# Patient Record
Sex: FEMALE | Race: WHITE | NOT HISPANIC OR LATINO | Employment: FULL TIME | URBAN - METROPOLITAN AREA
[De-identification: names, ages, dates, MRNs, and addresses within clinical notes are randomized per-mention and may not be internally consistent; named-entity substitution may affect disease eponyms.]

---

## 2021-01-26 ENCOUNTER — HOSPITAL ENCOUNTER (EMERGENCY)
Facility: MEDICAL CENTER | Age: 31
End: 2021-01-26
Attending: OBSTETRICS & GYNECOLOGY | Admitting: OBSTETRICS & GYNECOLOGY
Payer: COMMERCIAL

## 2021-01-26 VITALS
RESPIRATION RATE: 18 BRPM | DIASTOLIC BLOOD PRESSURE: 72 MMHG | WEIGHT: 173 LBS | BODY MASS INDEX: 28.82 KG/M2 | HEIGHT: 65 IN | SYSTOLIC BLOOD PRESSURE: 114 MMHG | HEART RATE: 81 BPM | OXYGEN SATURATION: 96 %

## 2021-01-26 LAB
ALBUMIN SERPL BCP-MCNC: 3.6 G/DL (ref 3.2–4.9)
ALBUMIN/GLOB SERPL: 1.3 G/DL
ALP SERPL-CCNC: 127 U/L (ref 30–99)
ALT SERPL-CCNC: 12 U/L (ref 2–50)
ANION GAP SERPL CALC-SCNC: 8 MMOL/L (ref 7–16)
APPEARANCE UR: CLEAR
AST SERPL-CCNC: 21 U/L (ref 12–45)
BASOPHILS # BLD AUTO: 0.2 % (ref 0–1.8)
BASOPHILS # BLD: 0.02 K/UL (ref 0–0.12)
BILIRUB SERPL-MCNC: 0.3 MG/DL (ref 0.1–1.5)
BUN SERPL-MCNC: 5 MG/DL (ref 8–22)
CALCIUM SERPL-MCNC: 9.4 MG/DL (ref 8.5–10.5)
CHLORIDE SERPL-SCNC: 103 MMOL/L (ref 96–112)
CO2 SERPL-SCNC: 24 MMOL/L (ref 20–33)
COLOR UR AUTO: YELLOW
CREAT SERPL-MCNC: 0.54 MG/DL (ref 0.5–1.4)
CREAT UR-MCNC: 33.92 MG/DL
EOSINOPHIL # BLD AUTO: 0.02 K/UL (ref 0–0.51)
EOSINOPHIL NFR BLD: 0.2 % (ref 0–6.9)
ERYTHROCYTE [DISTWIDTH] IN BLOOD BY AUTOMATED COUNT: 41.5 FL (ref 35.9–50)
GLOBULIN SER CALC-MCNC: 2.8 G/DL (ref 1.9–3.5)
GLUCOSE SERPL-MCNC: 86 MG/DL (ref 65–99)
GLUCOSE UR QL STRIP.AUTO: NEGATIVE MG/DL
HCT VFR BLD AUTO: 36.7 % (ref 37–47)
HGB BLD-MCNC: 12.4 G/DL (ref 12–16)
IMM GRANULOCYTES # BLD AUTO: 0.05 K/UL (ref 0–0.11)
IMM GRANULOCYTES NFR BLD AUTO: 0.5 % (ref 0–0.9)
KETONES UR QL STRIP.AUTO: NEGATIVE MG/DL
LEUKOCYTE ESTERASE UR QL STRIP.AUTO: ABNORMAL
LYMPHOCYTES # BLD AUTO: 2.19 K/UL (ref 1–4.8)
LYMPHOCYTES NFR BLD: 23 % (ref 22–41)
MCH RBC QN AUTO: 32 PG (ref 27–33)
MCHC RBC AUTO-ENTMCNC: 33.8 G/DL (ref 33.6–35)
MCV RBC AUTO: 94.8 FL (ref 81.4–97.8)
MONOCYTES # BLD AUTO: 0.66 K/UL (ref 0–0.85)
MONOCYTES NFR BLD AUTO: 6.9 % (ref 0–13.4)
NEUTROPHILS # BLD AUTO: 6.59 K/UL (ref 2–7.15)
NEUTROPHILS NFR BLD: 69.2 % (ref 44–72)
NITRITE UR QL STRIP.AUTO: NEGATIVE
NRBC # BLD AUTO: 0 K/UL
NRBC BLD-RTO: 0 /100 WBC
PH UR STRIP.AUTO: 6 [PH] (ref 5–8)
PLATELET # BLD AUTO: 221 K/UL (ref 164–446)
PMV BLD AUTO: 10.6 FL (ref 9–12.9)
POTASSIUM SERPL-SCNC: 4.2 MMOL/L (ref 3.6–5.5)
PROT SERPL-MCNC: 6.4 G/DL (ref 6–8.2)
PROT UR QL STRIP: NEGATIVE MG/DL
PROT UR-MCNC: 5 MG/DL (ref 0–15)
PROT/CREAT UR: 147 MG/G (ref 10–107)
RBC # BLD AUTO: 3.87 M/UL (ref 4.2–5.4)
RBC UR QL AUTO: NEGATIVE
SODIUM SERPL-SCNC: 135 MMOL/L (ref 135–145)
SP GR UR STRIP.AUTO: 1.01 (ref 1–1.03)
URATE SERPL-MCNC: 3.8 MG/DL (ref 1.9–8.2)
WBC # BLD AUTO: 9.5 K/UL (ref 4.8–10.8)

## 2021-01-26 PROCEDURE — 85025 COMPLETE CBC W/AUTO DIFF WBC: CPT

## 2021-01-26 PROCEDURE — 36415 COLL VENOUS BLD VENIPUNCTURE: CPT

## 2021-01-26 PROCEDURE — 80053 COMPREHEN METABOLIC PANEL: CPT

## 2021-01-26 PROCEDURE — 302449 STATCHG TRIAGE ONLY (STATISTIC)

## 2021-01-26 PROCEDURE — 81002 URINALYSIS NONAUTO W/O SCOPE: CPT

## 2021-01-26 PROCEDURE — 82570 ASSAY OF URINE CREATININE: CPT

## 2021-01-26 PROCEDURE — 84156 ASSAY OF PROTEIN URINE: CPT

## 2021-01-26 PROCEDURE — 59025 FETAL NON-STRESS TEST: CPT

## 2021-01-26 PROCEDURE — 84550 ASSAY OF BLOOD/URIC ACID: CPT

## 2021-01-26 NOTE — PROGRESS NOTES
1350: Report received from KATLYN Decker RN.     1430: Dr. Ardon updated with patient status, lab orders received.     1435: Pt educated on the POC, all questions and concerns addressed.     1446:  at bedside to draw labs.     1543: Dr. Ardon updated with patient's lab results, orders received to discharge patient home, have patient call and schedule an in person appointment with her next week.     1555: Discharge instructions gone over with patient, all questions and concerns addressed.

## 2021-01-26 NOTE — DISCHARGE INSTRUCTIONS
Pre-term Labor (<37 weeks):  Call your physician or return to the hospital if:  · You have painless regular contractions more than 4 in one hour.  · Your water breaks (remember time and color).  · You have menstrual-like cramps, a low dull backache or pressure in your pelvis or back.  · Your baby does not move enough to complete the daily kick count (10 movements in 2 hours).  · Your baby moves much less often than on the days before or you have not felt your baby move all day.  · Please review the MEDICATION LIST section of your AFTER VISIT SUMMARY document.  · Take your medication as prescribed

## 2021-01-26 NOTE — PROGRESS NOTES
29 y/o , EDC 21, EGA 35.5. Pt states she took her BP at home and it was 150s/90s. Pt states she had spotty/blurry vision last week and again today. Pt called OB office who told her to come to the hospital for evaluation. Pt states +FM, denies vaginal LOF/bleeding. States come tightening for an hour last night but no UCs today.

## 2021-02-24 ENCOUNTER — HOSPITAL ENCOUNTER (EMERGENCY)
Facility: MEDICAL CENTER | Age: 31
End: 2021-02-24
Attending: OBSTETRICS & GYNECOLOGY | Admitting: OBSTETRICS & GYNECOLOGY
Payer: COMMERCIAL

## 2021-02-24 VITALS
SYSTOLIC BLOOD PRESSURE: 127 MMHG | OXYGEN SATURATION: 100 % | HEART RATE: 86 BPM | RESPIRATION RATE: 17 BRPM | WEIGHT: 178 LBS | BODY MASS INDEX: 29.66 KG/M2 | DIASTOLIC BLOOD PRESSURE: 79 MMHG | TEMPERATURE: 98.6 F | HEIGHT: 65 IN

## 2021-02-24 PROCEDURE — 59025 FETAL NON-STRESS TEST: CPT

## 2021-02-24 PROCEDURE — 302449 STATCHG TRIAGE ONLY (STATISTIC)

## 2021-02-24 ASSESSMENT — FIBROSIS 4 INDEX: FIB4 SCORE: 0.8

## 2021-02-24 NOTE — PROGRESS NOTES
1414-Pt presents to L&D c/o UC's every 4 minutes for the past 3 hours. Denies LOF or VB and confirms +FM. Reports being .5 cm dilated yesterday in the office. TOCO and EFM applied. VS taken. POC discussed  1420-SVE as charted  1435-Left message for Dr. Ardon  1441-Dr. Ardon phoned back, updated on pt arrival/complaint/status, strip reviewed by provider, discharge orders received  1453-Pt discharged home, ambulatory and undelivered. Provided general and term labor instructions, understanding verbalized

## 2021-02-25 ENCOUNTER — ANESTHESIA (OUTPATIENT)
Dept: ANESTHESIOLOGY | Facility: MEDICAL CENTER | Age: 31
End: 2021-02-25
Payer: COMMERCIAL

## 2021-02-25 ENCOUNTER — HOSPITAL ENCOUNTER (INPATIENT)
Facility: MEDICAL CENTER | Age: 31
LOS: 2 days | End: 2021-02-27
Attending: OBSTETRICS & GYNECOLOGY | Admitting: OBSTETRICS & GYNECOLOGY
Payer: COMMERCIAL

## 2021-02-25 ENCOUNTER — ANESTHESIA EVENT (OUTPATIENT)
Dept: ANESTHESIOLOGY | Facility: MEDICAL CENTER | Age: 31
End: 2021-02-25
Payer: COMMERCIAL

## 2021-02-25 LAB
BASOPHILS # BLD AUTO: 0.4 % (ref 0–1.8)
BASOPHILS # BLD: 0.04 K/UL (ref 0–0.12)
EOSINOPHIL # BLD AUTO: 0.01 K/UL (ref 0–0.51)
EOSINOPHIL NFR BLD: 0.1 % (ref 0–6.9)
ERYTHROCYTE [DISTWIDTH] IN BLOOD BY AUTOMATED COUNT: 42.8 FL (ref 35.9–50)
ERYTHROCYTE [DISTWIDTH] IN BLOOD BY AUTOMATED COUNT: 43.7 FL (ref 35.9–50)
HCT VFR BLD AUTO: 35.3 % (ref 37–47)
HCT VFR BLD AUTO: 37.4 % (ref 37–47)
HGB BLD-MCNC: 11.7 G/DL (ref 12–16)
HGB BLD-MCNC: 12.8 G/DL (ref 12–16)
HOLDING TUBE BB 8507: NORMAL
IMM GRANULOCYTES # BLD AUTO: 0.06 K/UL (ref 0–0.11)
IMM GRANULOCYTES NFR BLD AUTO: 0.6 % (ref 0–0.9)
LYMPHOCYTES # BLD AUTO: 1.94 K/UL (ref 1–4.8)
LYMPHOCYTES NFR BLD: 18.2 % (ref 22–41)
MCH RBC QN AUTO: 31 PG (ref 27–33)
MCH RBC QN AUTO: 31.9 PG (ref 27–33)
MCHC RBC AUTO-ENTMCNC: 33.1 G/DL (ref 33.6–35)
MCHC RBC AUTO-ENTMCNC: 34.2 G/DL (ref 33.6–35)
MCV RBC AUTO: 93.3 FL (ref 81.4–97.8)
MCV RBC AUTO: 93.6 FL (ref 81.4–97.8)
MONOCYTES # BLD AUTO: 0.58 K/UL (ref 0–0.85)
MONOCYTES NFR BLD AUTO: 5.4 % (ref 0–13.4)
NEUTROPHILS # BLD AUTO: 8.05 K/UL (ref 2–7.15)
NEUTROPHILS NFR BLD: 75.3 % (ref 44–72)
NRBC # BLD AUTO: 0 K/UL
NRBC BLD-RTO: 0 /100 WBC
PLATELET # BLD AUTO: 155 K/UL (ref 164–446)
PLATELET # BLD AUTO: 196 K/UL (ref 164–446)
PMV BLD AUTO: 11 FL (ref 9–12.9)
PMV BLD AUTO: 11.1 FL (ref 9–12.9)
RBC # BLD AUTO: 3.77 M/UL (ref 4.2–5.4)
RBC # BLD AUTO: 4.01 M/UL (ref 4.2–5.4)
SARS-COV+SARS-COV-2 AG RESP QL IA.RAPID: NOTDETECTED
SARS-COV-2 RNA RESP QL NAA+PROBE: NOTDETECTED
SPECIMEN SOURCE: NORMAL
SPECIMEN SOURCE: NORMAL
WBC # BLD AUTO: 10.3 K/UL (ref 4.8–10.8)
WBC # BLD AUTO: 10.7 K/UL (ref 4.8–10.8)

## 2021-02-25 PROCEDURE — 700102 HCHG RX REV CODE 250 W/ 637 OVERRIDE(OP): Performed by: OBSTETRICS & GYNECOLOGY

## 2021-02-25 PROCEDURE — 85025 COMPLETE CBC W/AUTO DIFF WBC: CPT

## 2021-02-25 PROCEDURE — 36415 COLL VENOUS BLD VENIPUNCTURE: CPT

## 2021-02-25 PROCEDURE — 700111 HCHG RX REV CODE 636 W/ 250 OVERRIDE (IP): Performed by: OBSTETRICS & GYNECOLOGY

## 2021-02-25 PROCEDURE — 700111 HCHG RX REV CODE 636 W/ 250 OVERRIDE (IP): Performed by: ANESTHESIOLOGY

## 2021-02-25 PROCEDURE — 87426 SARSCOV CORONAVIRUS AG IA: CPT

## 2021-02-25 PROCEDURE — 303615 HCHG EPIDURAL/SPINAL ANESTHESIA FOR LABOR

## 2021-02-25 PROCEDURE — 700111 HCHG RX REV CODE 636 W/ 250 OVERRIDE (IP)

## 2021-02-25 PROCEDURE — U0005 INFEC AGEN DETEC AMPLI PROBE: HCPCS

## 2021-02-25 PROCEDURE — 770002 HCHG ROOM/CARE - OB PRIVATE (112)

## 2021-02-25 PROCEDURE — 700105 HCHG RX REV CODE 258: Performed by: ANESTHESIOLOGY

## 2021-02-25 PROCEDURE — 700105 HCHG RX REV CODE 258: Performed by: OBSTETRICS & GYNECOLOGY

## 2021-02-25 PROCEDURE — 0KQM0ZZ REPAIR PERINEUM MUSCLE, OPEN APPROACH: ICD-10-PCS | Performed by: OBSTETRICS & GYNECOLOGY

## 2021-02-25 PROCEDURE — U0003 INFECTIOUS AGENT DETECTION BY NUCLEIC ACID (DNA OR RNA); SEVERE ACUTE RESPIRATORY SYNDROME CORONAVIRUS 2 (SARS-COV-2) (CORONAVIRUS DISEASE [COVID-19]), AMPLIFIED PROBE TECHNIQUE, MAKING USE OF HIGH THROUGHPUT TECHNOLOGIES AS DESCRIBED BY CMS-2020-01-R: HCPCS

## 2021-02-25 PROCEDURE — 302449 STATCHG TRIAGE ONLY (STATISTIC)

## 2021-02-25 PROCEDURE — 85027 COMPLETE CBC AUTOMATED: CPT

## 2021-02-25 PROCEDURE — 59409 OBSTETRICAL CARE: CPT

## 2021-02-25 PROCEDURE — 304965 HCHG RECOVERY SERVICES

## 2021-02-25 PROCEDURE — A9270 NON-COVERED ITEM OR SERVICE: HCPCS | Performed by: OBSTETRICS & GYNECOLOGY

## 2021-02-25 RX ORDER — MISOPROSTOL 200 UG/1
800 TABLET ORAL
Status: DISCONTINUED | OUTPATIENT
Start: 2021-02-25 | End: 2021-02-27 | Stop reason: HOSPADM

## 2021-02-25 RX ORDER — OXYCODONE HYDROCHLORIDE 5 MG/1
5 TABLET ORAL EVERY 4 HOURS PRN
Status: DISCONTINUED | OUTPATIENT
Start: 2021-02-25 | End: 2021-02-27 | Stop reason: HOSPADM

## 2021-02-25 RX ORDER — DOCUSATE SODIUM 100 MG/1
100 CAPSULE, LIQUID FILLED ORAL 2 TIMES DAILY PRN
Status: DISCONTINUED | OUTPATIENT
Start: 2021-02-25 | End: 2021-02-27 | Stop reason: HOSPADM

## 2021-02-25 RX ORDER — ALUMINA, MAGNESIA, AND SIMETHICONE 2400; 2400; 240 MG/30ML; MG/30ML; MG/30ML
30 SUSPENSION ORAL EVERY 6 HOURS PRN
Status: DISCONTINUED | OUTPATIENT
Start: 2021-02-25 | End: 2021-02-25 | Stop reason: HOSPADM

## 2021-02-25 RX ORDER — VITAMIN A ACETATE, BETA CAROTENE, ASCORBIC ACID, CHOLECALCIFEROL, .ALPHA.-TOCOPHEROL ACETATE, DL-, THIAMINE MONONITRATE, RIBOFLAVIN, NIACINAMIDE, PYRIDOXINE HYDROCHLORIDE, FOLIC ACID, CYANOCOBALAMIN, CALCIUM CARBONATE, FERROUS FUMARATE, ZINC OXIDE, CUPRIC OXIDE 3080; 12; 120; 400; 1; 1.84; 3; 20; 22; 920; 25; 200; 27; 10; 2 [IU]/1; UG/1; MG/1; [IU]/1; MG/1; MG/1; MG/1; MG/1; MG/1; [IU]/1; MG/1; MG/1; MG/1; MG/1; MG/1
1 TABLET, FILM COATED ORAL
Status: DISCONTINUED | OUTPATIENT
Start: 2021-02-25 | End: 2021-02-27 | Stop reason: HOSPADM

## 2021-02-25 RX ORDER — SODIUM CHLORIDE, SODIUM LACTATE, POTASSIUM CHLORIDE, AND CALCIUM CHLORIDE .6; .31; .03; .02 G/100ML; G/100ML; G/100ML; G/100ML
250 INJECTION, SOLUTION INTRAVENOUS PRN
Status: DISCONTINUED | OUTPATIENT
Start: 2021-02-25 | End: 2021-02-25 | Stop reason: HOSPADM

## 2021-02-25 RX ORDER — ROPIVACAINE HYDROCHLORIDE 2 MG/ML
INJECTION, SOLUTION EPIDURAL; INFILTRATION; PERINEURAL CONTINUOUS
Status: DISCONTINUED | OUTPATIENT
Start: 2021-02-25 | End: 2021-02-27 | Stop reason: HOSPADM

## 2021-02-25 RX ORDER — IBUPROFEN 600 MG/1
600 TABLET ORAL EVERY 6 HOURS PRN
Status: DISCONTINUED | OUTPATIENT
Start: 2021-02-25 | End: 2021-02-27 | Stop reason: HOSPADM

## 2021-02-25 RX ORDER — SODIUM CHLORIDE, SODIUM LACTATE, POTASSIUM CHLORIDE, CALCIUM CHLORIDE 600; 310; 30; 20 MG/100ML; MG/100ML; MG/100ML; MG/100ML
INJECTION, SOLUTION INTRAVENOUS PRN
Status: DISCONTINUED | OUTPATIENT
Start: 2021-02-25 | End: 2021-02-27 | Stop reason: HOSPADM

## 2021-02-25 RX ORDER — ACETAMINOPHEN 325 MG/1
325 TABLET ORAL EVERY 4 HOURS PRN
Status: DISCONTINUED | OUTPATIENT
Start: 2021-02-25 | End: 2021-02-27 | Stop reason: HOSPADM

## 2021-02-25 RX ORDER — BUPIVACAINE HYDROCHLORIDE 2.5 MG/ML
INJECTION, SOLUTION EPIDURAL; INFILTRATION; INTRACAUDAL PRN
Status: DISCONTINUED | OUTPATIENT
Start: 2021-02-25 | End: 2021-02-25 | Stop reason: SURG

## 2021-02-25 RX ORDER — ONDANSETRON 2 MG/ML
4 INJECTION INTRAMUSCULAR; INTRAVENOUS EVERY 6 HOURS PRN
Status: DISCONTINUED | OUTPATIENT
Start: 2021-02-25 | End: 2021-02-27 | Stop reason: HOSPADM

## 2021-02-25 RX ORDER — SODIUM CHLORIDE, SODIUM LACTATE, POTASSIUM CHLORIDE, AND CALCIUM CHLORIDE .6; .31; .03; .02 G/100ML; G/100ML; G/100ML; G/100ML
1000 INJECTION, SOLUTION INTRAVENOUS
Status: COMPLETED | OUTPATIENT
Start: 2021-02-25 | End: 2021-02-25

## 2021-02-25 RX ORDER — ROPIVACAINE HYDROCHLORIDE 2 MG/ML
INJECTION, SOLUTION EPIDURAL; INFILTRATION; PERINEURAL
Status: COMPLETED
Start: 2021-02-25 | End: 2021-02-25

## 2021-02-25 RX ORDER — IBUPROFEN 600 MG/1
600 TABLET ORAL EVERY 6 HOURS PRN
Status: DISCONTINUED | OUTPATIENT
Start: 2021-02-25 | End: 2021-02-25

## 2021-02-25 RX ORDER — SODIUM CHLORIDE, SODIUM LACTATE, POTASSIUM CHLORIDE, CALCIUM CHLORIDE 600; 310; 30; 20 MG/100ML; MG/100ML; MG/100ML; MG/100ML
INJECTION, SOLUTION INTRAVENOUS CONTINUOUS
Status: ACTIVE | OUTPATIENT
Start: 2021-02-25 | End: 2021-02-25

## 2021-02-25 RX ORDER — BUPIVACAINE HYDROCHLORIDE 2.5 MG/ML
INJECTION, SOLUTION EPIDURAL; INFILTRATION; INTRACAUDAL
Status: COMPLETED
Start: 2021-02-25 | End: 2021-02-25

## 2021-02-25 RX ORDER — MISOPROSTOL 200 UG/1
800 TABLET ORAL
Status: DISCONTINUED | OUTPATIENT
Start: 2021-02-25 | End: 2021-02-25 | Stop reason: HOSPADM

## 2021-02-25 RX ORDER — ONDANSETRON 4 MG/1
4 TABLET, ORALLY DISINTEGRATING ORAL EVERY 6 HOURS PRN
Status: DISCONTINUED | OUTPATIENT
Start: 2021-02-25 | End: 2021-02-27 | Stop reason: HOSPADM

## 2021-02-25 RX ADMIN — BUPIVACAINE HYDROCHLORIDE 10 ML: 2.5 INJECTION, SOLUTION EPIDURAL; INFILTRATION; INTRACAUDAL; PERINEURAL at 11:33

## 2021-02-25 RX ADMIN — SODIUM CHLORIDE, POTASSIUM CHLORIDE, SODIUM LACTATE AND CALCIUM CHLORIDE: 600; 310; 30; 20 INJECTION, SOLUTION INTRAVENOUS at 09:30

## 2021-02-25 RX ADMIN — SODIUM CHLORIDE, POTASSIUM CHLORIDE, SODIUM LACTATE AND CALCIUM CHLORIDE 1000 ML: 600; 310; 30; 20 INJECTION, SOLUTION INTRAVENOUS at 10:30

## 2021-02-25 RX ADMIN — ROPIVACAINE HYDROCHLORIDE: 2 INJECTION, SOLUTION EPIDURAL; INFILTRATION; PERINEURAL at 11:36

## 2021-02-25 RX ADMIN — OXYTOCIN 125 ML/HR: 10 INJECTION, SOLUTION INTRAMUSCULAR; INTRAVENOUS at 15:46

## 2021-02-25 RX ADMIN — IBUPROFEN 600 MG: 600 TABLET, FILM COATED ORAL at 16:56

## 2021-02-25 RX ADMIN — ROPIVACAINE HYDROCHLORIDE: 2 INJECTION, SOLUTION EPIDURAL; INFILTRATION at 11:36

## 2021-02-25 RX ADMIN — OXYTOCIN 1 MILLI-UNITS/MIN: 10 INJECTION, SOLUTION INTRAMUSCULAR; INTRAVENOUS at 09:30

## 2021-02-25 RX ADMIN — FENTANYL CITRATE 100 MCG: 50 INJECTION, SOLUTION INTRAMUSCULAR; INTRAVENOUS at 11:33

## 2021-02-25 ASSESSMENT — PAIN DESCRIPTION - PAIN TYPE: TYPE: ACUTE PAIN

## 2021-02-25 ASSESSMENT — PATIENT HEALTH QUESTIONNAIRE - PHQ9
2. FEELING DOWN, DEPRESSED, IRRITABLE, OR HOPELESS: NOT AT ALL
1. LITTLE INTEREST OR PLEASURE IN DOING THINGS: NOT AT ALL
SUM OF ALL RESPONSES TO PHQ9 QUESTIONS 1 AND 2: 0
SUM OF ALL RESPONSES TO PHQ9 QUESTIONS 1 AND 2: 0
1. LITTLE INTEREST OR PLEASURE IN DOING THINGS: NOT AT ALL
2. FEELING DOWN, DEPRESSED, IRRITABLE, OR HOPELESS: NOT AT ALL

## 2021-02-25 ASSESSMENT — LIFESTYLE VARIABLES
EVER_SMOKED: NEVER
ALCOHOL_USE: NO

## 2021-02-25 ASSESSMENT — FIBROSIS 4 INDEX: FIB4 SCORE: 0.8

## 2021-02-25 ASSESSMENT — PAIN SCALES - GENERAL: PAIN_LEVEL: 0

## 2021-02-25 NOTE — PROGRESS NOTES
Pt comfortable with epidural  Cx 3-4/90/-1 (per RN)  Had SROM of clear fluid just now  CTXs q2-5 min  FHTs reactive, reassuring, category I  Pitocin @ 4 mu  Will follow     awestfall

## 2021-02-25 NOTE — ANESTHESIA PROCEDURE NOTES
Epidural Block    Date/Time: 2/25/2021 11:24 AM  Performed by: Eladio Fernandes M.D.  Authorized by: Eladio Fernandes M.D.     Patient Location:  OB  Start Time:  2/25/2021 11:24 AM  End Time:  2/25/2021 11:34 AM  Reason for Block: labor analgesia    patient identified, IV checked, site marked, risks and benefits discussed, surgical consent, monitors and equipment checked, pre-op evaluation and timeout performed    Patient Position:  Sitting  Prep: ChloraPrep, patient draped and sterile technique    Monitoring:  Blood pressure, continuous pulse oximetry and heart rate  Approach:  Midline  Location:  L3-L4  Injection Technique:  SHAKIR air  Skin infiltration:  Lidocaine  Strength:  1%  Dose:  3ml  Needle Type:  Tuohy  Needle Gauge:  17 G  Needle Length:  3.5 in  Loss of resistance::  7  Catheter Size:  19 G  Catheter at Skin Depth:  20  Test Dose Result:  Negative

## 2021-02-25 NOTE — PROGRESS NOTES
30 y.o.    EDC=  40 weeks    TOCO and US on.  VSS.  Pt presents to triage with c/o UCs since yesterday.  Denies heavy VB, or LOF and states pos FM.  SVE=1-2/-2.  Pt was seen yesterday and was a FT, she states she has been laboring since then and did not sleep last night.  The patient would like to be admitted.  Dr. Cannon called and report given on the above stated.  Orders to admit the patient for labor.  0900-SVE=2-3//-1.  Dr. Ardon called and updated on pt status.  0930-Oxytocin started per pump at 1  milliunit/min.  1120-Dr Fernandes at BS to place epidural. 1235-Guardado catheter placed, SVE=3-4/90/-1.  1243-SROM, clear.  1245-Dr. Ardon at BS, updated on pt status.  1400-SVE=complete.  Dr. Ardon called and updated.  1450-Delivery of viable female, 8/9 apgars.  1454-placenta delivered, pitocin running.  2nd degree repair, EBL=250ml.  1700-Epidural catheter removed, pt unable to get up to the BR due to unstable gait.  Pericare and pad on, pivoted to WC and transferred to PP room.

## 2021-02-25 NOTE — PROGRESS NOTES
"  Position: PHYLLIS  Placenta: spontaneous, intact, 3VC  Lac: 2 MLL; 3-0 vicryl  EBL: 250cc  Complications: none  Apgars: 8 and 9  Female \"Yasmin\"  DICTATED  # [id number]    awestfall  "

## 2021-02-25 NOTE — H&P
"Obstetrics & Gynecology History & Physical Note    Date of Service  2021    Chief Complaint  CTXs    History of Presenting Illness  Kristi Arnett is a 30 y.o.  at 40w0d 2021, by 5 and 8 week US. No LMP recorded. Patient is pregnant. She is being admitted for labor management    Prenatal care is at Dr Ardon and is complicated by:  1. uncomplicated    There are no problems to display for this patient.      Obstetric History  OB History    Para Term  AB Living   1             SAB TAB Ectopic Molar Multiple Live Births                    # Outcome Date GA Lbr Gallito/2nd Weight Sex Delivery Anes PTL Lv   1 Current                Gynecologic History  No HSV oral or genital pt or     Review of Systems  No S&S of PI    Medical History  negative    Surgical History  negative     Family History  family history is not on file.     Social History   reports that she has never smoked. She has never used smokeless tobacco. She reports that she does not drink alcohol and does not use drugs.    Allergies  No Known Allergies    Medications  Prior to Admission Medications   Prescriptions Last Dose Informant Patient Reported? Taking?   Prenatal MV-Min-Fe Fum-FA-DHA (PRENATAL 1 PO) 2021 at Unknown time  Yes Yes   Sig: Take  by mouth.      Facility-Administered Medications: None       Physical Exam  Vitals:    21 0500 21 0600 21 0913   BP:  125/73 113/79   Pulse:  75 87   Resp:  16    Temp:  36.6 °C (97.8 °F)    TempSrc:  Temporal    Weight: 80.7 kg (178 lb)     Height: 1.651 m (5' 5\")         General:   alert, cooperative, no distress   Skin:   normal   HEENT:  extraocular movements intact   Lungs:   clear to auscultation bilaterally   Heart:   regular rate and rhythm   Breasts:   deferred   Abdomen:  Abdomen soft, non-tender; gravid.   Pelvis: Exam deferred.   FHT:  130 BPM Fetal heart variability: moderate  Fetal Heart Rate decelerations: none  Fetal Heart Rate " accelerations: yes   Rotan: External US   Presentations:   by exam   Cervix:     Dilation:  3-4    Effacement:  90    Station:   -1    Consistency:      Position:         Laboratory:  Prenatal Results     General (Most Recent Result)     Test Value Reference Range Date Time    ABO        Rh        Antibody screen        HbA1c        Gonorrhea        Chlamydia  by PCR        Chlamydia by DNA        RPR/Syphilus        HSV 1/2 by PCR (non-serum)        HSV 1/2 (serum)        HSV 1        HSV 2        HPV (16)        HPV (18)        HPV (other)        HBsAg        HIV-1 HIV-2 Antibodies        Rubella        Tb              Pap Smear (Most Recent Result)     Test Value Reference Range Date Time    Pap smear        Pap smear w/HPV        Pap smear w/CTNG        Pap smar w/HPV CTNG        Pap smear (reflex HPV ACUS)        Pap smear (reflex HPV ASCUS w/CTNG)        Pathology gyn specimen              Urinalysis (Most Recent Result)     Test Value Reference Range Date Time    Urinalysis        Urinalysis (culture if indicated)        POC urinalysis        Urine drug screen        Urine drug screen (w/o conf)        Urine culture ([id number])        Urine culture ([id number])              1st Trimester     Test Value Reference Range Date Time    Hgb        Hct        Fasting Glucose Tolerance        GTT, 1 hour        GTT, 2 hours        GTT, 3 hours              2nd Trimester     Test Value Reference Range Date Time    Hgb        Hct        Urinalysis        Urine Culture        AST        ALT        Uric Acid        Fasting Glucose Tolerance        GTT, 1 hour        GTT, 2 hours        GTT, 3 hours        Urine Protein/Creatinine Ratio              3rd Trimester     Test Value Reference Range Date Time    Hgb 12.8 g/dL 12.0 - 16.0 02/25/21 0640      12.4 g/dL 12.0 - 16.0 01/26/21 1445    Hct 37.4 % 37.0 - 47.0 02/25/21 0640      36.7 % 37.0 - 47.0 01/26/21 1445    Platelet count 196 K/uL 164 - 446 02/25/21 0640      221  K/uL 164 - 446 21 1445    GBS (DING BROTH)        GBS (Direct)        Urinalysis        Urine Culture        Urine Drug Screen        Urine Protein/Creatinine Ratio  Abnormal    (See Report)    21 1323          Congenital Disease Screening     Test Value Reference Range Date Time    First Trimester Screen        Quad Screen        Sickle Cell        Thalassemia        [facility]        Cystic Fibrosis Carrier Study                     View all results for this pregnancy                        Urinalysis:    No results found     Imaging:  Rh+; immune, neg, neg, neg  Glucola normal  TdAP and Flu given 20  GBS negative      Assessment:  30 y.o.  40w0d who presents with  Indication for care in labor or delivery [O75.9]    Plan:  No new Assessment & Plan notes have been filed under this hospital service since the last note was generated.  Service: Obstetrics & Gynecology    1. Admit to L&D  2. Labor management/ augmentation with Pitocin  3. FWB: reactive, reassuring, category I  4. GBS: negative  5. Pain Control: has epidural now    VTE prophylaxis: SIMRAN Ardon M.D.

## 2021-02-26 PROCEDURE — 700102 HCHG RX REV CODE 250 W/ 637 OVERRIDE(OP): Performed by: OBSTETRICS & GYNECOLOGY

## 2021-02-26 PROCEDURE — A9270 NON-COVERED ITEM OR SERVICE: HCPCS | Performed by: OBSTETRICS & GYNECOLOGY

## 2021-02-26 PROCEDURE — 770002 HCHG ROOM/CARE - OB PRIVATE (112)

## 2021-02-26 RX ADMIN — IBUPROFEN 600 MG: 600 TABLET, FILM COATED ORAL at 15:55

## 2021-02-26 RX ADMIN — IBUPROFEN 600 MG: 600 TABLET, FILM COATED ORAL at 00:35

## 2021-02-26 RX ADMIN — PRENATAL WITH FERROUS FUM AND FOLIC ACID 1 TABLET: 3080; 920; 120; 400; 22; 1.84; 3; 20; 10; 1; 12; 200; 27; 25; 2 TABLET ORAL at 08:52

## 2021-02-26 ASSESSMENT — EDINBURGH POSTNATAL DEPRESSION SCALE (EPDS)
I HAVE BEEN ANXIOUS OR WORRIED FOR NO GOOD REASON: HARDLY EVER
I HAVE BEEN SO UNHAPPY THAT I HAVE HAD DIFFICULTY SLEEPING: NOT AT ALL
I HAVE FELT SCARED OR PANICKY FOR NO GOOD REASON: NO, NOT AT ALL
I HAVE FELT SAD OR MISERABLE: NO, NOT AT ALL
THE THOUGHT OF HARMING MYSELF HAS OCCURRED TO ME: NEVER
THINGS HAVE BEEN GETTING ON TOP OF ME: NO, I HAVE BEEN COPING AS WELL AS EVER
I HAVE BLAMED MYSELF UNNECESSARILY WHEN THINGS WENT WRONG: NO, NEVER
I HAVE LOOKED FORWARD WITH ENJOYMENT TO THINGS: AS MUCH AS I EVER DID
I HAVE BEEN SO UNHAPPY THAT I HAVE BEEN CRYING: NO, NEVER
I HAVE BEEN ABLE TO LAUGH AND SEE THE FUNNY SIDE OF THINGS: AS MUCH AS I ALWAYS COULD

## 2021-02-26 ASSESSMENT — PAIN DESCRIPTION - PAIN TYPE: TYPE: ACUTE PAIN

## 2021-02-26 NOTE — PROGRESS NOTES
0800 Assessment completed, fundus firm, lochia light, POC reviewed, verbalized understanding. Denies pain at this time, will call if pain med intervention needed.

## 2021-02-26 NOTE — ANESTHESIA POSTPROCEDURE EVALUATION
Patient: Kristi Arnett    Procedure Summary     Date: 02/25/21 Room / Location:     Anesthesia Start: 1122 Anesthesia Stop: 1450    Procedure: Labor Epidural Diagnosis:     Scheduled Providers:  Responsible Provider: Eladio Fernandes M.D.    Anesthesia Type: epidural ASA Status: 2          Final Anesthesia Type: epidural  Last vitals  BP   Blood Pressure: 107/73    Temp   36.8 °C (98.3 °F)    Pulse   82   Resp   17    SpO2   96 %      Anesthesia Post Evaluation    Patient location during evaluation: floor  Patient participation: complete - patient participated  Level of consciousness: awake and alert  Pain score: 0    Airway patency: patent  Anesthetic complications: no  Cardiovascular status: hemodynamically stable  Respiratory status: acceptable  Hydration status: euvolemic    PONV: none          No complications documented.     Nurse Pain Score: 0 (NPRS)

## 2021-02-26 NOTE — L&D DELIVERY NOTE
DATE OF SERVICE:  2021     DELIVERING PHYSICIAN:  Sarah Ardon MD     PRIMARY OBSTETRICIAN:  Sarah Ardon MD     BRIEF HISTORY:  This is a 30-year-old  1, para 0 at 40 weeks' gestation   with an EDC of 2021, who presented this morning with contractions and   was 1-2 cm, 70%, -2.  She had been seen the night prior and finger tip, so she   was admitted for labor management.  She was admitted.  She was started on   Pitocin.  She received an epidural for labor pain.  She had spontaneous   rupture of membranes of clear fluid and she progressed quite rapidly from 4 cm   to complete.  She pushed for a short period of time and proceeded with   delivery.  Fetal heart tracing is reactive, reassuring, category 1 throughout   the entire labor and delivery process.  Group B Strep culture is negative.     DELIVERY NOTE:  Delivery is normal spontaneous vaginal delivery.     FETAL POSITION:  Left occiput anterior.     COMPLICATIONS:  None.     ESTIMATED BLOOD LOSS:  250 mL.     PLACENTA:  Delivered spontaneously.     CONDITION:  Intact with 3-vessel cord.     LACERATION:  She had a small second-degree midline laceration, repaired   postdelivery with 3-0 Vicryl suture.     Infant is female.  Apgars 8 and 9 at 1 and 5 minutes respectively.  Weight is   pending at this time.  Her name is Yasmin.     NARRATIVE:  I was called.  The patient is complete and .  Upon my   arrival, she was placed in dorsal lithotomy position and prepped   appropriately.  With the ensuing contractions, she delivered the fetal vertex   in left occiput anterior presentation.  Nose and mouth suctioned with bulb   suction of clear fluid and maternal expulsive efforts easily delivered the   anterior shoulder without traction followed by the posterior shoulder and   remainder of the infant and a pink, vigorous, crying infant handed directly to   the mother's chest.  Approximately 2-1/2 minutes after delivery, the cord was    doubly clamped and is cut by the father of the baby, Aixa.  The placenta   then delivered spontaneously.  It was carefully inspected and found to be   intact with a 3-vessel cord.  IV Pitocin and bimanual massage.  Fundus firms   up well with minimal postpartum bleeding.  The vagina and perineum were   carefully inspected and found to be with a small second-degree midline   laceration; therefore, it was repaired in normal running fashion with 3-0   Vicryl suture.  At this time, all instruments and sponges were removed.    Sponge counts correct and the procedure was ended.  Both mother and baby   tolerated delivery well and are stable and bonding postpartum.        ______________________________  MD TED Brooks/DHAVAL/CASS    DD:  02/25/2021 15:11  DT:  02/25/2021 17:04    Job#:  [id number]

## 2021-02-26 NOTE — ANESTHESIA TIME REPORT
Anesthesia Start and Stop Event Times     Date Time Event    2/25/2021 1122 Anesthesia Start     1450 Anesthesia Stop        Responsible Staff  02/25/21    Name Role Begin End    Eladio Fernandes M.D. Anesth 1122 1450        Preop Diagnosis (Free Text):  Pre-op Diagnosis             Preop Diagnosis (Codes):    Post op Diagnosis  Vaginal delivery      Premium Reason  Non-Premium    Comments:

## 2021-02-26 NOTE — PROGRESS NOTES
1720 -Patient transferred from labor and delivery. Two RN verification of infant and parent armbands. Report received from DINESH Ma. Patient oriented to unit, call light, emergency light, and infant security. Assessment completed, fundus firm at 2 finger width below u, lochia light. Patient has not voided yet, has until 1850, pt is aware and verbalized understanding. Patient declines intervention for pain at this time. Pitocin infusing at 125 ml/hr. Patient encouraged to call with needs. Rounding in place. Bed is locked and in lowest position, call light within reach.

## 2021-02-26 NOTE — LACTATION NOTE
This note was copied from a baby's chart.  Initial visit. Mother states infant has latched, but has been sleepy. Offered assistance with latch, worked on cradle hold on left, which felt more comfortable with mother. Infant not cueing at this time. Discussed maternal and infant body alignment, asymmetrical latch, tummy to tummy, where to place hands for breast support. Offered other breast, worked in cross-cradle hold, mother states more comfort with this position. Infant did spit up, clear fluids. Mother reports infant has been spitty a few times. Provided reassurance that infant may not show feeding cues at this time, but to keep attempting to latch. Discussed feeding behaviors and what to expect at 24-48-72 hours of age. Encouraged skin to skin and hand expression. Showed mother how to hand express, colostrum easily seen. Reviewed feeding cues, lengths and frequencies of feedings.    Plan is to breastfeed with cues, at least 3-4 hours from last feeding, a minimum of 8 or more feeds in a 24 hour period.     No other lactation questions or concerns at this time, encouraged to call for support as needed.

## 2021-02-26 NOTE — PROGRESS NOTES
PPD# 1    S/P     S- doing well, no complaints. Ambulating. Voiding without difficulties. Tolerating regular diet. Breast feeding. Reports normal lochia. Adequate pain control.     O-   Vitals:    21 1735 21 2200 21 0200 21 0600   BP: 107/73 (!) 98/56 124/72 109/79   Pulse: 82 63 61 67   Resp: 17 16 14 16   Temp: 36.8 °C (98.3 °F) 36.7 °C (98 °F) 36.8 °C (98.2 °F) 36.4 °C (97.5 °F)   TempSrc: Temporal Temporal Temporal Temporal   SpO2: 96% 95% 96% 97%   Weight:       Height:         Abdomen: soft, ND, NT; fundus firm below umbulicus    Recent Labs     21  0640 21  2246   HEMOGLOBIN 12.8 11.7*   HEMATOCRIT 37.4 35.3*   MCV 93.3 93.6   MCH 31.9 31.0   PLATELETCT 196 155*       A- S/P  doing well    P- continue routine pp care  Anticipate D/C home tomorrow with baby    Sarah Ardon MD

## 2021-02-26 NOTE — PROGRESS NOTES
Assessment completed. Plan of care reviewed; verbalized understanding. Reports pain to be tolerable at this time. Encouraged to call is needed pain intervention.

## 2021-02-27 VITALS
OXYGEN SATURATION: 95 % | BODY MASS INDEX: 29.66 KG/M2 | TEMPERATURE: 97.1 F | HEART RATE: 63 BPM | DIASTOLIC BLOOD PRESSURE: 72 MMHG | HEIGHT: 65 IN | RESPIRATION RATE: 16 BRPM | WEIGHT: 178 LBS | SYSTOLIC BLOOD PRESSURE: 116 MMHG

## 2021-02-27 PROCEDURE — 700102 HCHG RX REV CODE 250 W/ 637 OVERRIDE(OP): Performed by: OBSTETRICS & GYNECOLOGY

## 2021-02-27 PROCEDURE — A9270 NON-COVERED ITEM OR SERVICE: HCPCS | Performed by: OBSTETRICS & GYNECOLOGY

## 2021-02-27 RX ORDER — IBUPROFEN 600 MG/1
600 TABLET ORAL EVERY 6 HOURS PRN
Qty: 30 TABLET | Refills: 1 | Status: SHIPPED | OUTPATIENT
Start: 2021-02-27

## 2021-02-27 RX ADMIN — IBUPROFEN 600 MG: 600 TABLET, FILM COATED ORAL at 02:19

## 2021-02-27 RX ADMIN — PRENATAL WITH FERROUS FUM AND FOLIC ACID 1 TABLET: 3080; 920; 120; 400; 22; 1.84; 3; 20; 10; 1; 12; 200; 27; 25; 2 TABLET ORAL at 09:18

## 2021-02-27 NOTE — PROGRESS NOTES
Assessment completed. Plan of care reviewed; verbalized understanding. Reports pain to be tolerable at this time. Instructed to call if needed pain medication.

## 2021-02-27 NOTE — PROGRESS NOTES
0700 Received bedside report from DINESH Lopes. Discussed plan of care. Patient will call for pain medication as needed. All needs met at this time.

## 2021-02-27 NOTE — DISCHARGE SUMMARY
Discharge Summary:      Kristi Arnett    Admit Date:   2021  Discharge Date:  2021     Admitting diagnosis:  Indication for care in labor or delivery [O75.9]  Discharge Diagnosis: Status post vaginal, spontaneous.  Pregnancy Complications: none  Tubal Ligation:  no        History:  History reviewed. No pertinent past medical history.  OB History    Para Term  AB Living   1 1 1     1   SAB TAB Ectopic Molar Multiple Live Births           0 1      # Outcome Date GA Lbr Gallito/2nd Weight Sex Delivery Anes PTL Lv   1 Term 21 40w0d 09:00 / 00:50 3.06 kg (6 lb 11.9 oz) F Vag-Spont EPI N GILBERTO        Patient has no known allergies.  There are no problems to display for this patient.       Hospital Course:   30 y.o. , now para 1, was admitted with the above mentioned diagnosis, underwent Active Labor, vaginal, spontaneous. Patient postpartum course was unremarkable, with progressive advancement in diet , ambulation and toleration of oral analgesia. Patient without complaints today and desires discharge.      Vitals:    21 2200 21 0200 21 0600 21 1800   BP: (!) 98/56 124/72 109/79 120/70   Pulse: 63 61 67 79   Resp: 16 14 16 16   Temp: 36.7 °C (98 °F) 36.8 °C (98.2 °F) 36.4 °C (97.5 °F) 36.9 °C (98.5 °F)   TempSrc: Temporal Temporal Temporal Temporal   SpO2: 95% 96% 97% 96%   Weight:       Height:           Current Facility-Administered Medications   Medication Dose   • ondansetron (ZOFRAN ODT) dispertab 4 mg  4 mg    Or   • ondansetron (ZOFRAN) syringe/vial injection 4 mg  4 mg   • oxytocin (PITOCIN) infusion (for induction)  0.5-20 rain-units/min   • oxytocin (PITOCIN) infusion (for postpartum)   mL/hr   • ibuprofen (MOTRIN) tablet 600 mg  600 mg   • acetaminophen (Tylenol) tablet 325 mg  325 mg   • ropivacaine 0.2 % (NAROPIN) injection     • LR infusion     • PRN oxytocin (PITOCIN) (20 Units/1000 mL) PRN for excessive uterine bleeding - See Admin  Instr  125-999 mL/hr   • miSOPROStol (CYTOTEC) tablet 800 mcg  800 mcg   • prenatal plus vitamin (STUARTNATAL 1+1) 27-1 MG tablet 1 tablet  1 tablet   • docusate sodium (COLACE) capsule 100 mg  100 mg   • oxyCODONE immediate-release (ROXICODONE) tablet 5 mg  5 mg       Exam:  Breast Exam: negative  Abdomen: Abdomen soft, non-tender. BS normal. No masses,  No organomegaly  Fundus Non Tender: yes  Extremity: extremities, peripheral pulses and reflexes normal     Labs:  Recent Labs     21  0640 21  2246   WBC 10.7 10.3   RBC 4.01* 3.77*   HEMOGLOBIN 12.8 11.7*   HEMATOCRIT 37.4 35.3*   MCV 93.3 93.6   MCH 31.9 31.0   MCHC 34.2 33.1*   RDW 42.8 43.7   PLATELETCT 196 155*   MPV 11.0 11.1        Activity:   Discharge to home  Pelvic Rest x 6 weeks    Assessment:  normal postpartum course  Discharge Assessment: stable s/p      Follow up: 6 weeks with Dr Ardon     Discharge Meds:   No current outpatient medications on file.   Ibuprofen  Continue PNVs    Sarah Ardon M.D.

## 2021-02-27 NOTE — LACTATION NOTE
This note was copied from a baby's chart.  JULISSA is a 29 y/o  who delivered a 40 0/7 gestation. Infant has been latching and breastfeeding without difficulty since delivery. Last night she started cluster feeding. Lactation education review as in assessment.Discussion of changed in stools and increasing voids which is sign of infant getting enough.     Info given for outpatient lactation support through  medicine and the BF Zoom Meetings. JULISSA states that her aunt is a retired lactation consultant and is offering support.     Breastfeeding plan:    Feed on cue a minimum of 8x/24 hours with cluster feeding as normal during growth spurts.     Teach signs that infant is getting enough as 4-6 wet diapers by day four when the milk comes in, @ 1 week 6-8 voids there after, and increasing number of stools each day transitioning to bright yellow seedy loose stools.     Follow up with PEDS PCP as scheduled     Call for breastfeeding for 1:1 lactation consultation through  Medicine as needed and attend the  Zoom [employer] without need for appointment.